# Patient Record
Sex: FEMALE | Race: WHITE
[De-identification: names, ages, dates, MRNs, and addresses within clinical notes are randomized per-mention and may not be internally consistent; named-entity substitution may affect disease eponyms.]

---

## 2018-07-18 ENCOUNTER — HOSPITAL ENCOUNTER (OUTPATIENT)
Dept: HOSPITAL 75 - RAD | Age: 73
End: 2018-07-18
Attending: FAMILY MEDICINE
Payer: MEDICARE
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
## 2018-07-18 DIAGNOSIS — R41.3: Primary | ICD-10-CM

## 2018-07-18 DIAGNOSIS — R42: ICD-10-CM

## 2018-07-18 PROCEDURE — 70450 CT HEAD/BRAIN W/O DYE: CPT

## 2018-07-18 NOTE — DIAGNOSTIC IMAGING REPORT
PROCEDURE: CT head without contrast.



TECHNIQUE: Multiple contiguous axial images were obtained through

the brain without the use of intravenous contrast.



INDICATION: Memory loss and dizziness



COMPARISON: 1/1/2013



FINDINGS: No acute intracranial hemorrhage, mass effect or edema

is seen. Gray-white junction is preserved. There is mild diffuse

atrophy. The ventricles appear normal. Few scattered areas of

hypodensity in the periventricular white matter likely related to

chronic small vessel ischemic change. The paranasal sinuses and

mastoids are clear as visualized.



IMPRESSION: No evidence of an acute intracranial abnormality.



Dictated by: 



  Dictated on workstation # [id number]

## 2020-01-12 ENCOUNTER — HOSPITAL ENCOUNTER (EMERGENCY)
Dept: HOSPITAL 75 - ER | Age: 75
Discharge: HOME | End: 2020-01-12
Payer: MEDICARE

## 2020-01-12 VITALS — SYSTOLIC BLOOD PRESSURE: 137 MMHG | DIASTOLIC BLOOD PRESSURE: 77 MMHG

## 2020-01-12 VITALS — SYSTOLIC BLOOD PRESSURE: 155 MMHG | DIASTOLIC BLOOD PRESSURE: 63 MMHG

## 2020-01-12 VITALS — BODY MASS INDEX: 22.35 KG/M2 | HEIGHT: 62.01 IN | WEIGHT: 123.02 LBS

## 2020-01-12 VITALS — SYSTOLIC BLOOD PRESSURE: 164 MMHG | DIASTOLIC BLOOD PRESSURE: 85 MMHG

## 2020-01-12 DIAGNOSIS — Z79.82: ICD-10-CM

## 2020-01-12 DIAGNOSIS — H81.10: Primary | ICD-10-CM

## 2020-01-12 DIAGNOSIS — F03.90: ICD-10-CM

## 2020-01-12 DIAGNOSIS — Z88.0: ICD-10-CM

## 2020-01-12 DIAGNOSIS — Z90.710: ICD-10-CM

## 2020-01-12 DIAGNOSIS — I95.1: ICD-10-CM

## 2020-01-12 DIAGNOSIS — Z90.49: ICD-10-CM

## 2020-01-12 DIAGNOSIS — I10: ICD-10-CM

## 2020-01-12 LAB
ALBUMIN SERPL-MCNC: 4.6 GM/DL (ref 3.2–4.5)
ALP SERPL-CCNC: 78 U/L (ref 40–136)
ALT SERPL-CCNC: 19 U/L (ref 0–55)
APTT PPP: YELLOW S
BACTERIA #/AREA URNS HPF: NEGATIVE /HPF
BASOPHILS # BLD AUTO: 0 10^3/UL (ref 0–0.1)
BASOPHILS NFR BLD AUTO: 1 % (ref 0–10)
BILIRUB SERPL-MCNC: 0.5 MG/DL (ref 0.1–1)
BILIRUB UR QL STRIP: NEGATIVE
BUN/CREAT SERPL: 12
CALCIUM SERPL-MCNC: 10.2 MG/DL (ref 8.5–10.1)
CHLORIDE SERPL-SCNC: 105 MMOL/L (ref 98–107)
CO2 SERPL-SCNC: 20 MMOL/L (ref 21–32)
CREAT SERPL-MCNC: 1.05 MG/DL (ref 0.6–1.3)
EOSINOPHIL # BLD AUTO: 0 10^3/UL (ref 0–0.3)
EOSINOPHIL NFR BLD AUTO: 1 % (ref 0–10)
ERYTHROCYTE [DISTWIDTH] IN BLOOD BY AUTOMATED COUNT: 12 % (ref 10–14.5)
FIBRINOGEN PPP-MCNC: CLEAR MG/DL
GFR SERPLBLD BASED ON 1.73 SQ M-ARVRAT: 51 ML/MIN
GLUCOSE SERPL-MCNC: 105 MG/DL (ref 70–105)
GLUCOSE UR STRIP-MCNC: NEGATIVE MG/DL
HCT VFR BLD CALC: 41 % (ref 35–52)
HGB BLD-MCNC: 14.2 G/DL (ref 11.5–16)
KETONES UR QL STRIP: NEGATIVE
LEUKOCYTE ESTERASE UR QL STRIP: (no result)
LYMPHOCYTES # BLD AUTO: 2.9 X 10^3 (ref 1–4)
LYMPHOCYTES NFR BLD AUTO: 42 % (ref 12–44)
MANUAL DIFFERENTIAL PERFORMED BLD QL: NO
MCH RBC QN AUTO: 32 PG (ref 25–34)
MCHC RBC AUTO-ENTMCNC: 35 G/DL (ref 32–36)
MCV RBC AUTO: 92 FL (ref 80–99)
MONOCYTES # BLD AUTO: 0.5 X 10^3 (ref 0–1)
MONOCYTES NFR BLD AUTO: 7 % (ref 0–12)
NEUTROPHILS # BLD AUTO: 3.4 X 10^3 (ref 1.8–7.8)
NEUTROPHILS NFR BLD AUTO: 49 % (ref 42–75)
NITRITE UR QL STRIP: NEGATIVE
PH UR STRIP: 7.5 [PH] (ref 5–9)
PLATELET # BLD: 246 10^3/UL (ref 130–400)
PMV BLD AUTO: 9.8 FL (ref 7.4–10.4)
POTASSIUM SERPL-SCNC: 3.5 MMOL/L (ref 3.6–5)
PROT SERPL-MCNC: 7.7 GM/DL (ref 6.4–8.2)
PROT UR QL STRIP: NEGATIVE
RBC #/AREA URNS HPF: (no result) /HPF
SODIUM SERPL-SCNC: 141 MMOL/L (ref 135–145)
SP GR UR STRIP: 1.01 (ref 1.02–1.02)
SQUAMOUS #/AREA URNS HPF: (no result) /HPF
WBC # BLD AUTO: 6.9 10^3/UL (ref 4.3–11)
WBC #/AREA URNS HPF: (no result) /HPF

## 2020-01-12 PROCEDURE — 80053 COMPREHEN METABOLIC PANEL: CPT

## 2020-01-12 PROCEDURE — 86141 C-REACTIVE PROTEIN HS: CPT

## 2020-01-12 PROCEDURE — 71045 X-RAY EXAM CHEST 1 VIEW: CPT

## 2020-01-12 PROCEDURE — 82550 ASSAY OF CK (CPK): CPT

## 2020-01-12 PROCEDURE — 70450 CT HEAD/BRAIN W/O DYE: CPT

## 2020-01-12 PROCEDURE — 81000 URINALYSIS NONAUTO W/SCOPE: CPT

## 2020-01-12 PROCEDURE — 36415 COLL VENOUS BLD VENIPUNCTURE: CPT

## 2020-01-12 PROCEDURE — 85025 COMPLETE CBC W/AUTO DIFF WBC: CPT

## 2020-01-12 PROCEDURE — 96360 HYDRATION IV INFUSION INIT: CPT

## 2020-01-12 PROCEDURE — 93005 ELECTROCARDIOGRAM TRACING: CPT

## 2020-01-12 PROCEDURE — 84484 ASSAY OF TROPONIN QUANT: CPT

## 2020-01-12 NOTE — ED GENERAL
General


Chief Complaint:  Neurological Problems


Stated Complaint:  SHAKING, BARELY ABLE TO WALK, HAS DEMENTIA


Nursing Triage Note:  


Family reports pt has hx of dementia.  For the past 1-1.5 years she has had 


episodes of increased confusion, agitation, shakiness and weakness.  The family 


feels that they need some explanation for these episodes and want to know what 


they can do to improve things.  Today's episode started this morning.


Nursing Sepsis Screen:  No Definite Risk


Source of Information:  Patient, Family


Exam Limitations:  No Limitations





History of Present Illness


Date Seen by Provider:  2020


Time Seen by Provider:  18:30


Initial Comments


Patient presents to ER by private conveyance from home with sister, daughter and

son. She says she does not want be here. She has a chronic issue with vertigo 

dizziness and has been put on meclizine as well as Xanax by Dr. Elder. It 

worked for a while but for the past couple days they say she's been having 

difficulty feeling weak and tired hard to walk And gets very dizzy whenever she 

sits up or rolls over. She says it will pass and she'll have several good days. 

They say she will spend most of the day in bed when she has the vertigo. She has

no history of coronary disease stroke, weakness numbness confusion, slurred 

speech. She is able to walk into the ER. She is having no dysuria. She only has 

mild bouts of dizziness when she sits up in bed. She denies nausea fever chills 

cough shortness of breath or chest pain.





Allergies and Home Medications


Allergies


Coded Allergies:  


     Penicillins (Verified  Allergy, RASH, 13)





Home Medications


Aspirin 81 Mg Tabec, 81 MG PO DAILY, (Reported)


Lorazepam 1 Mg Tablet, 0.5 MG PO DAILY, (Reported)


Lorazepam 1 Mg Tablet, 1 MG PO HS, (Reported)


Losartan Potassium 25 Mg Tablet, 25 MG PO DAILY, (Reported)


Meclizine Hcl 25 Mg Tab, 1-2 TAB PO Q 4-6 HOURS PRN


   FOR DIZZINESS 


   Prescribed by: JOSE MALDONADO on 13


Memantine HCl 10 Mg Tablet, 10 MG PO BID, (Reported)


Naproxen 500 Mg Tablet, 1 EACH PO BID, (Reported)


Omega 3 Polyunsat Fatty Acids 1,000 Mg Cap, 1,000 MG PO DAILY, (Reported)


Omeprazole 40 Mg Capsule.dr, 40 MG PO DAILY, (Reported)


Ondansetron Hcl 4 Mg Tab, 4 MG PO Q4H


   FOR NAUSEA AND VOMITING 


   Prescribed by: JOSE MALDONADO on 13


Pravastatin Sodium 10 Mg Tablet, 10 MG PO DAILY, (Reported)


Scopolamine Hcl 1 Patch .72 H Patch.td72, 1 EA TD Q3D


   FOR DIZZINESS 


   Prescribed by: JOSE MALDONADO on 13


Vitamin B Complex 1 Each Capsule, 1 EACH PO DAILY, (Reported)





Patient Home Medication List


Home Medication List Reviewed:  Yes





Review of Systems


Review of Systems


Constitutional:  No chills, No diaphoresis; dizziness


EENTM:  No ear discharge, No ear pain


Respiratory:  No cough, No short of breath


Cardiovascular:  No chest pain, No palpitations


Gastrointestinal:  No abdominal pain, No constipation, No diarrhea, No nausea


Genitourinary:  No discharge, No dysuria


Musculoskeletal:  No back pain, No joint pain


Skin:  No pruritus, No rash


Psychiatric/Neurological:  Denies Headache, Denies Numbness, Denies Paresthesia





All Other Systems Reviewed


Negative Unless Noted:  Yes





Past Medical-Social-Family Hx


Patient Social History


Alcohol Use:  Rarely Uses


Recreational Drug Use:  No


Smoking Status:  Never a Smoker


Recent Foreign Travel:  No


Contact w/Someone Who Travel:  No


Recent Infectious Disease Expo:  No


Recent Hopitalizations:  No


Physical Abuse:  No


Sexual Abuse:  No


Mistreated:  No


Fear:  No





Past Medical History


Surgeries:  Yes


Appendectomy, Hysterectomy


Cardiac:  Yes


Hypertension


Neurological:  Yes


Dementia


Musculoskeletal:  Yes


Arthritis, Chronic Back Pain





Physical Exam


Vital Signs





Vital Signs - First Documented








 20





 17:57


 


Temp 35.9


 


Pulse 82


 


Resp 24


 


B/P (MAP) 173/84 (113)


 


Pulse Ox 100


 


O2 Delivery Room Air





Capillary Refill : Less Than 3 Seconds


Height, Weight, BMI


Height: '"


Weight: lbs. oz. kg; 22.00 BMI


Method:Estimated


General Appearance:  No Apparent Distress, WD/WN, Anxious


Eyes:  Bilateral Eye Normal Inspection, Bilateral Eye PERRL, Bilateral Eye EOMI


HEENT:  PERRL/EOMI, TMs Normal, Normal ENT Inspection, Pharynx Normal, Moist 

Mucous Membranes


Neck:  Full Range of Motion, Normal Inspection, Non Tender, Supple


Respiratory:  Lungs Clear, Normal Breath Sounds, No Accessory Muscle Use, No 

Respiratory Distress


Cardiovascular:  Regular Rate, Rhythm, No Edema, Normal Peripheral Pulses


Gastrointestinal:  Normal Bowel Sounds, No Organomegaly, Non Tender, Soft


Extremity:  Normal Capillary Refill, Normal Inspection, Normal Range of Motion, 

Non Tender


Neurologic/Psychiatric:  Alert, Oriented x3, No Motor/Sensory Deficits, Other 

(fine motor tremor)


Skin:  Normal Color, Warm/Dry





Progress/Results/Core Measures


Suspected Sepsis


Recent Fever Within 48 Hours:  No


Infection Criteria Present:  None


New/Unexplained  Altered Menta:  No


Sepsis Screen:  No Definite Risk


SIRS


Temperature: 


Pulse: 88 


Respiratory Rate: 24


 


Laboratory Tests


20 18:45: White Blood Count 6.9


Blood Pressure 137 /91 


Mean: 106


 


Laboratory Tests


20 18:45: 


Creatinine 1.05, Platelet Count 246, Total Bilirubin 0.5








Results/Orders


Lab Results





Laboratory Tests








Test


 20


18:45 20


19:30 Range/Units


 


 


White Blood Count


 6.9 


 


 4.3-11.0


10^3/uL


 


Red Blood Count


 4.42 


 


 4.35-5.85


10^6/uL


 


Hemoglobin 14.2   11.5-16.0  G/DL


 


Hematocrit 41   35-52  %


 


Mean Corpuscular Volume 92   80-99  FL


 


Mean Corpuscular Hemoglobin 32   25-34  PG


 


Mean Corpuscular Hemoglobin


Concent 35 


 


 32-36  G/DL





 


Red Cell Distribution Width 12.0   10.0-14.5  %


 


Platelet Count


 246 


 


 130-400


10^3/uL


 


Mean Platelet Volume 9.8   7.4-10.4  FL


 


Neutrophils (%) (Auto) 49   42-75  %


 


Lymphocytes (%) (Auto) 42   12-44  %


 


Monocytes (%) (Auto) 7   0-12  %


 


Eosinophils (%) (Auto) 1   0-10  %


 


Basophils (%) (Auto) 1   0-10  %


 


Neutrophils # (Auto) 3.4   1.8-7.8  X 10^3


 


Lymphocytes # (Auto) 2.9   1.0-4.0  X 10^3


 


Monocytes # (Auto) 0.5   0.0-1.0  X 10^3


 


Eosinophils # (Auto)


 0.0 


 


 0.0-0.3


10^3/uL


 


Basophils # (Auto)


 0.0 


 


 0.0-0.1


10^3/uL


 


Sodium Level 141   135-145  MMOL/L


 


Potassium Level 3.5 L  3.6-5.0  MMOL/L


 


Chloride Level 105     MMOL/L


 


Carbon Dioxide Level 20 L  21-32  MMOL/L


 


Anion Gap 16 H  5-14  MMOL/L


 


Blood Urea Nitrogen 13   7-18  MG/DL


 


Creatinine


 1.05 


 


 0.60-1.30


MG/DL


 


Estimat Glomerular Filtration


Rate 51 


 


  





 


BUN/Creatinine Ratio 12    


 


Glucose Level 105     MG/DL


 


Calcium Level 10.2 H  8.5-10.1  MG/DL


 


Corrected Calcium    8.5-10.1  MG/DL


 


Total Bilirubin 0.5   0.1-1.0  MG/DL


 


Aspartate Amino Transf


(AST/SGOT) 21 


 


 5-34  U/L





 


Alanine Aminotransferase


(ALT/SGPT) 19 


 


 0-55  U/L





 


Alkaline Phosphatase 78     U/L


 


Total Creatine Kinase 70     U/L


 


Troponin I < 0.028   <0.028  NG/ML


 


C-Reactive Protein High


Sensitivity 0.05 


 


 0.00-0.50


MG/DL


 


Total Protein 7.7   6.4-8.2  GM/DL


 


Albumin 4.6 H  3.2-4.5  GM/DL


 


Urine Color  YELLOW   


 


Urine Clarity  CLEAR   


 


Urine pH  7.5  5-9  


 


Urine Specific Gravity  1.015 L 1.016-1.022  


 


Urine Protein  NEGATIVE  NEGATIVE  


 


Urine Glucose (UA)  NEGATIVE  NEGATIVE  


 


Urine Ketones  NEGATIVE  NEGATIVE  


 


Urine Nitrite  NEGATIVE  NEGATIVE  


 


Urine Bilirubin  NEGATIVE  NEGATIVE  


 


Urine Urobilinogen  0.2  < = 1.0  MG/DL


 


Urine Leukocyte Esterase  1+ H NEGATIVE  


 


Urine RBC (Auto)  NEGATIVE  NEGATIVE  


 


Urine RBC  NONE   /HPF


 


Urine WBC  2-5   /HPF


 


Urine Squamous Epithelial


Cells 


 2-5 


  /HPF





 


Urine Crystals  NONE   /LPF


 


Urine Bacteria  NEGATIVE   /HPF


 


Urine Casts  NONE   /LPF


 


Urine Mucus  NEGATIVE   /LPF


 


Urine Culture Indicated  NO   








My Orders





Orders - GLADYS BARBER


Ua Culture If Indicated (20 18:25)


Cbc With Automated Diff (20 18:25)


Comprehensive Metabolic Panel (20 18:25)


Hs C Reactive Protein (20 18:25)


Orthostatic Vital Signs (Adult (20 18:25)


Ekg Tracing (20 18:25)


Troponin I (20 18:25)


Ct Head Wo (20 18:25)


Chest 1 View, Ap/Pa Only (20 18:25)


Creatine Kinase (20 19:06)


Ed Iv/Invasive Line Start (20 19:06)


Lactated Ringers (Lr 1000 Ml Iv Solution (20 19:06)





Medications Given in ED





Current Medications








 Medications  Dose


 Ordered  Sig/Maureen


 Route  Start Time


 Stop Time Status Last Admin


Dose Admin


 


 Lactated Ringer's  1,000 ml @ 


 0 mls/hr  Q0M ONCE


 IV  20 19:06


 20 19:08 DC 20 19:20


1,000 MLS/HR








Vital Signs/I&O











 20





 17:57 18:28 20:30


 


Temp 35.9  


 


Pulse 82 75 89





  80 87





  88 85


 


Resp 24  


 


B/P (MAP) 173/84 (113) 177/77 (110) 164/70 (101)





  165/79 (107) 163/85 (111)





  137/91 (106) 151/73 (99)


 


Pulse Ox 100  


 


O2 Delivery Room Air  





Capillary Refill : Less Than 3 Seconds








Blood Pressure Mean:                    106








Progress Note :  


   Time:  19:29


Progress Note


Patient has a fine essential tremor. She has vertigo. She's tried meclizine and 

benzodiazepines. She will need further outpatient management of this. We will 

try and teacher some Epley's maneuvers. We'll rule out anemia, infection, 

electrolyte derangements or significant dehydration. Set of orthostatic vital 

signs will also be helpful.





Orthostatics are positive. We will give her a liter of LR.





ECG


Initial ECG Impression Date:  2020


Initial ECG Impression Time:  18:45


Initial ECG Rate:  66


Initial ECG Rhythm:  Normal Sinus


Initial ECG Intervals:  Normal


Initial ECG Impression:  Normal


Comment


Normal sinus rhythm without ST elevation or depression





Diagnostic Imaging





   Diagonstic Imaging:  Xray


   Plain Films/CT/US/NM/MRI:  chest (1v)


Comments


                 [facility] [place], [place]





NAME:   NICANOR BHATTI


[facility] REC#:   [id number]


ACCOUNT#:   [id number]


PT STATUS:   REG ER


:   1945


PHYSICIAN:   GLADYS BARBER MD


ADMIT DATE:   20/ER


                                   ***Draft***


Date of Exam:20





CHEST 1 VIEW, AP/PA ONLY





INDICATION: Increased confusion





COMPARISON: 2013





FINDINGS: Single frontal view of the chest demonstrates normal


heart size and pulmonary vascularity. The lungs are


hyperinflated, but are otherwise clear. No large pleural effusion


or pneumothorax is seen. The visualized osseous structures show


no acute abnormalities.





IMPRESSION: 


1. No acute cardiopulmonary process. 


2. Hyperinflation of the lungs. Correlation with underlying


obstructive pulmonary disease is recommended.





  Dictated on workstation # [id number]





Dict:   20


Trans:   20


RENUKA [id number]





Interpreted by:     KRISTAL NEWSOME MD


Electronically signed by:


   Reviewed:  Reviewed by Me








   Diagonstic Imaging:  CT (w/o contrast)


   Plain Films/CT/US/NM/MRI:  head


Comments


NAME:   NICANOR BHATTI


[facility] REC#:   [id number]


ACCOUNT#:   [id number]


PT STATUS:   REG ER


:   1945


PHYSICIAN:   GLADYS BARBER MD


ADMIT DATE:   20/ER


                                   ***Draft***


Date of Exam:20





CT HEAD WO





INDICATION: History of dementia. Increased confusion.





TECHNIQUE: Routine non contrast-enhanced axial images were


obtained from the skull base to the vertex. Auto Exposure


Controls were utilized during the CT exam to meet ALARA standards


for radiation dose reduction





COMPARISON: 2018





FINDINGS: The ventricles and cortical sulci are diffusely


prominent, compatible with age-related volume loss. There are


confluent areas of abnormal, low attenuation in the


periventricular white matter. This is consistent with chronic


small vessel ischemic changes.  





There is no midline shift or mass-effect. No acute intra-axial


hemorrhage is seen. There are no abnormal areas of increased or


decreased density to suggest acute hemorrhage or edema. No


extra-axial masses or collections are present. The bony calvarium


is intact. The visualized paranasal sinuses are unremarkable. The


mastoid air cells are clear.





IMPRESSION:  


1. No acute intracranial abnormality. No CT evidence of mass,


acute infarct or intracranial hemorrhage.


2. Chronic small vessel ischemic changes in the deep white


matter.





  Dictated on workstation # [id number]





Dict:   20


Trans:   20


RENUKA [id number]





Interpreted by:     KRISTAL NEWSOME MD


Electronically signed by:


   Reviewed:  Reviewed by Me





Departure


Impression





   Primary Impression:  


   Orthostasis


   Additional Impressions:  


   BPPV (benign paroxysmal positional vertigo)


   Qualified Codes:  H81.10 - Benign paroxysmal vertigo, unspecified ear


   Physical deconditioning


Disposition:  01 HOME, SELF-CARE


Condition:  Stable





Departure-Patient Inst.


Decision time for Depature:  20:37


Referrals:  


VENKATESH ELDER MD (PCP/Family)


Primary Care Physician


Patient Instructions:  Orthostatic Hypotension (DC), Vestibular Exercises





Add. Discharge Instructions:  


Drink plenty of fluids.


Follow-up with your primary care provider to discuss management of BPPV and 

deconditioning. Physical therapy may be beneficial.


Perform the Epley maneuvers as described if you have a bout of vertigo.





All discharge instructions reviewed with patient and/or family. Voiced 

understanding.











GLADYS BARBER                 2020 19:19

## 2020-01-12 NOTE — DIAGNOSTIC IMAGING REPORT
INDICATION: History of dementia. Increased confusion.



TECHNIQUE: Routine non contrast-enhanced axial images were

obtained from the skull base to the vertex. Auto Exposure

Controls were utilized during the CT exam to meet ALARA standards

for radiation dose reduction



COMPARISON: 07/18/2018



FINDINGS: The ventricles and cortical sulci are diffusely

prominent, compatible with age-related volume loss. There are

confluent areas of abnormal, low attenuation in the

periventricular white matter. This is consistent with chronic

small vessel ischemic changes.  



There is no midline shift or mass-effect. No acute intra-axial

hemorrhage is seen. There are no abnormal areas of increased or

decreased density to suggest acute hemorrhage or edema. No

extra-axial masses or collections are present. The bony calvarium

is intact. The visualized paranasal sinuses are unremarkable. The

mastoid air cells are clear.



IMPRESSION:  

1. No acute intracranial abnormality. No CT evidence of mass,

acute infarct or intracranial hemorrhage.

2. Chronic small vessel ischemic changes in the deep white

matter.



Dictated by: 



  Dictated on workstation # [id number]

## 2020-01-12 NOTE — DIAGNOSTIC IMAGING REPORT
INDICATION: Increased confusion



COMPARISON: 11/01/2013



FINDINGS: Single frontal view of the chest demonstrates normal

heart size and pulmonary vascularity. The lungs are

hyperinflated, but are otherwise clear. No large pleural effusion

or pneumothorax is seen. The visualized osseous structures show

no acute abnormalities.



IMPRESSION: 

1. No acute cardiopulmonary process. 

2. Hyperinflation of the lungs. Correlation with underlying

obstructive pulmonary disease is recommended.



Dictated by: 



  Dictated on workstation # [id number]